# Patient Record
Sex: MALE | URBAN - METROPOLITAN AREA
[De-identification: names, ages, dates, MRNs, and addresses within clinical notes are randomized per-mention and may not be internally consistent; named-entity substitution may affect disease eponyms.]

---

## 2020-10-08 ENCOUNTER — HOSPITAL ENCOUNTER (OUTPATIENT)
Facility: CLINIC | Age: 45
Setting detail: OBSERVATION
Discharge: HOME | End: 2020-10-08
Attending: INTERNAL MEDICINE | Admitting: INTERNAL MEDICINE
Payer: COMMERCIAL

## 2020-10-08 VITALS
DIASTOLIC BLOOD PRESSURE: 51 MMHG | OXYGEN SATURATION: 90 % | TEMPERATURE: 101.5 F | HEIGHT: 71 IN | WEIGHT: 272.8 LBS | BODY MASS INDEX: 38.19 KG/M2 | HEART RATE: 92 BPM | RESPIRATION RATE: 18 BRPM | SYSTOLIC BLOOD PRESSURE: 114 MMHG

## 2020-10-08 DIAGNOSIS — I40.0 MYOCARDITIS DUE TO 2019-NCOV: Primary | ICD-10-CM

## 2020-10-08 DIAGNOSIS — U07.1 MYOCARDITIS DUE TO 2019-NCOV: Primary | ICD-10-CM

## 2020-10-08 LAB
ALBUMIN SERPL-MCNC: 2.8 G/DL (ref 3.4–5)
ALBUMIN SERPL-MCNC: 3 G/DL (ref 3.4–5)
ALP SERPL-CCNC: 58 U/L (ref 40–150)
ALP SERPL-CCNC: 59 U/L (ref 40–150)
ALT SERPL W P-5'-P-CCNC: 118 U/L (ref 0–70)
ALT SERPL W P-5'-P-CCNC: 123 U/L (ref 0–70)
ANION GAP SERPL CALCULATED.3IONS-SCNC: 5 MMOL/L (ref 3–14)
ANION GAP SERPL CALCULATED.3IONS-SCNC: 7 MMOL/L (ref 3–14)
AST SERPL W P-5'-P-CCNC: 255 U/L (ref 0–45)
AST SERPL W P-5'-P-CCNC: 267 U/L (ref 0–45)
BILIRUB SERPL-MCNC: 0.6 MG/DL (ref 0.2–1.3)
BILIRUB SERPL-MCNC: 0.6 MG/DL (ref 0.2–1.3)
BUN SERPL-MCNC: 11 MG/DL (ref 7–30)
BUN SERPL-MCNC: 13 MG/DL (ref 7–30)
CALCIUM SERPL-MCNC: 7.2 MG/DL (ref 8.5–10.1)
CALCIUM SERPL-MCNC: 7.8 MG/DL (ref 8.5–10.1)
CHLORIDE SERPL-SCNC: 91 MMOL/L (ref 94–109)
CHLORIDE SERPL-SCNC: 94 MMOL/L (ref 94–109)
CK SERPL-CCNC: 8536 U/L (ref 30–300)
CK SERPL-CCNC: 9048 U/L (ref 30–300)
CK SERPL-CCNC: 9368 U/L (ref 30–300)
CO2 SERPL-SCNC: 27 MMOL/L (ref 20–32)
CO2 SERPL-SCNC: 30 MMOL/L (ref 20–32)
CREAT SERPL-MCNC: 0.93 MG/DL (ref 0.66–1.25)
CREAT SERPL-MCNC: 0.94 MG/DL (ref 0.66–1.25)
CREAT SERPL-MCNC: 0.96 MG/DL (ref 0.66–1.25)
CRP SERPL-MCNC: 87.4 MG/L (ref 0–8)
D DIMER PPP FEU-MCNC: 0.9 UG/ML FEU (ref 0–0.5)
GFR SERPL CREATININE-BSD FRML MDRD: >90 ML/MIN/{1.73_M2}
GLUCOSE SERPL-MCNC: 106 MG/DL (ref 70–99)
GLUCOSE SERPL-MCNC: 108 MG/DL (ref 70–99)
INR PPP: 1.14 (ref 0.86–1.14)
INTERPRETATION ECG - MUSE: NORMAL
NT-PROBNP SERPL-MCNC: 175 PG/ML (ref 0–450)
POTASSIUM SERPL-SCNC: 3.1 MMOL/L (ref 3.4–5.3)
POTASSIUM SERPL-SCNC: 3.1 MMOL/L (ref 3.4–5.3)
POTASSIUM SERPL-SCNC: 3.3 MMOL/L (ref 3.4–5.3)
PROT SERPL-MCNC: 6.1 G/DL (ref 6.8–8.8)
PROT SERPL-MCNC: 6.4 G/DL (ref 6.8–8.8)
SODIUM SERPL-SCNC: 125 MMOL/L (ref 133–144)
SODIUM SERPL-SCNC: 127 MMOL/L (ref 133–144)
SODIUM SERPL-SCNC: 129 MMOL/L (ref 133–144)
TROPONIN I SERPL-MCNC: 0.03 UG/L (ref 0–0.04)
TROPONIN I SERPL-MCNC: 0.04 UG/L (ref 0–0.04)
TROPONIN I SERPL-MCNC: 0.04 UG/L (ref 0–0.04)

## 2020-10-08 PROCEDURE — 82565 ASSAY OF CREATININE: CPT | Performed by: INTERNAL MEDICINE

## 2020-10-08 PROCEDURE — 82550 ASSAY OF CK (CPK): CPT | Mod: 91 | Performed by: INTERNAL MEDICINE

## 2020-10-08 PROCEDURE — 84484 ASSAY OF TROPONIN QUANT: CPT | Performed by: INTERNAL MEDICINE

## 2020-10-08 PROCEDURE — 85379 FIBRIN DEGRADATION QUANT: CPT | Performed by: INTERNAL MEDICINE

## 2020-10-08 PROCEDURE — 99207 PR NO BILLABLE SERVICE THIS VISIT: CPT | Performed by: INTERNAL MEDICINE

## 2020-10-08 PROCEDURE — 84484 ASSAY OF TROPONIN QUANT: CPT | Mod: 91 | Performed by: INTERNAL MEDICINE

## 2020-10-08 PROCEDURE — 85610 PROTHROMBIN TIME: CPT | Performed by: INTERNAL MEDICINE

## 2020-10-08 PROCEDURE — 96372 THER/PROPH/DIAG INJ SC/IM: CPT | Performed by: INTERNAL MEDICINE

## 2020-10-08 PROCEDURE — 258N000003 HC RX IP 258 OP 636: Performed by: INTERNAL MEDICINE

## 2020-10-08 PROCEDURE — 36415 COLL VENOUS BLD VENIPUNCTURE: CPT | Performed by: INTERNAL MEDICINE

## 2020-10-08 PROCEDURE — 99235 HOSP IP/OBS SAME DATE MOD 70: CPT | Performed by: INTERNAL MEDICINE

## 2020-10-08 PROCEDURE — 86140 C-REACTIVE PROTEIN: CPT | Performed by: INTERNAL MEDICINE

## 2020-10-08 PROCEDURE — 84132 ASSAY OF SERUM POTASSIUM: CPT | Performed by: INTERNAL MEDICINE

## 2020-10-08 PROCEDURE — 93005 ELECTROCARDIOGRAM TRACING: CPT

## 2020-10-08 PROCEDURE — 84295 ASSAY OF SERUM SODIUM: CPT | Performed by: INTERNAL MEDICINE

## 2020-10-08 PROCEDURE — 250N000013 HC RX MED GY IP 250 OP 250 PS 637: Performed by: INTERNAL MEDICINE

## 2020-10-08 PROCEDURE — G0378 HOSPITAL OBSERVATION PER HR: HCPCS

## 2020-10-08 PROCEDURE — 250N000011 HC RX IP 250 OP 636: Performed by: INTERNAL MEDICINE

## 2020-10-08 PROCEDURE — 80053 COMPREHEN METABOLIC PANEL: CPT | Performed by: INTERNAL MEDICINE

## 2020-10-08 PROCEDURE — 83880 ASSAY OF NATRIURETIC PEPTIDE: CPT | Performed by: INTERNAL MEDICINE

## 2020-10-08 PROCEDURE — 82550 ASSAY OF CK (CPK): CPT | Performed by: INTERNAL MEDICINE

## 2020-10-08 RX ORDER — ACETAMINOPHEN 325 MG/1
650 TABLET ORAL EVERY 4 HOURS PRN
Status: DISCONTINUED | OUTPATIENT
Start: 2020-10-08 | End: 2020-10-08 | Stop reason: HOSPADM

## 2020-10-08 RX ORDER — HYDROXYZINE HYDROCHLORIDE 25 MG/1
25 TABLET, FILM COATED ORAL EVERY 6 HOURS PRN
Status: DISCONTINUED | OUTPATIENT
Start: 2020-10-08 | End: 2020-10-08 | Stop reason: HOSPADM

## 2020-10-08 RX ORDER — MULTIVITAMIN,THERAPEUTIC
1 TABLET ORAL DAILY
COMMUNITY

## 2020-10-08 RX ORDER — ACETAMINOPHEN 650 MG/1
650 SUPPOSITORY RECTAL EVERY 4 HOURS PRN
Status: DISCONTINUED | OUTPATIENT
Start: 2020-10-08 | End: 2020-10-08 | Stop reason: HOSPADM

## 2020-10-08 RX ORDER — POLYETHYLENE GLYCOL 3350 17 G/17G
17 POWDER, FOR SOLUTION ORAL DAILY PRN
Status: DISCONTINUED | OUTPATIENT
Start: 2020-10-08 | End: 2020-10-08 | Stop reason: HOSPADM

## 2020-10-08 RX ORDER — HYDROMORPHONE HYDROCHLORIDE 1 MG/ML
0.3 INJECTION, SOLUTION INTRAMUSCULAR; INTRAVENOUS; SUBCUTANEOUS
Status: DISCONTINUED | OUTPATIENT
Start: 2020-10-08 | End: 2020-10-08 | Stop reason: HOSPADM

## 2020-10-08 RX ORDER — LANOLIN ALCOHOL/MO/W.PET/CERES
3 CREAM (GRAM) TOPICAL
Status: DISCONTINUED | OUTPATIENT
Start: 2020-10-08 | End: 2020-10-08 | Stop reason: HOSPADM

## 2020-10-08 RX ORDER — MAGNESIUM SULFATE HEPTAHYDRATE 40 MG/ML
4 INJECTION, SOLUTION INTRAVENOUS EVERY 4 HOURS PRN
Status: DISCONTINUED | OUTPATIENT
Start: 2020-10-08 | End: 2020-10-08 | Stop reason: HOSPADM

## 2020-10-08 RX ORDER — POTASSIUM CL/LIDO/0.9 % NACL 10MEQ/0.1L
10 INTRAVENOUS SOLUTION, PIGGYBACK (ML) INTRAVENOUS
Status: DISCONTINUED | OUTPATIENT
Start: 2020-10-08 | End: 2020-10-08 | Stop reason: HOSPADM

## 2020-10-08 RX ORDER — OXYCODONE HYDROCHLORIDE 5 MG/1
5-10 TABLET ORAL
Status: DISCONTINUED | OUTPATIENT
Start: 2020-10-08 | End: 2020-10-08 | Stop reason: HOSPADM

## 2020-10-08 RX ORDER — AMOXICILLIN 250 MG
1 CAPSULE ORAL 2 TIMES DAILY PRN
Status: DISCONTINUED | OUTPATIENT
Start: 2020-10-08 | End: 2020-10-08 | Stop reason: HOSPADM

## 2020-10-08 RX ORDER — HYDROXYZINE HYDROCHLORIDE 25 MG/1
50 TABLET, FILM COATED ORAL EVERY 6 HOURS PRN
Status: DISCONTINUED | OUTPATIENT
Start: 2020-10-08 | End: 2020-10-08 | Stop reason: HOSPADM

## 2020-10-08 RX ORDER — HYDROXYZINE HYDROCHLORIDE 25 MG/1
25-50 TABLET, FILM COATED ORAL
COMMUNITY

## 2020-10-08 RX ORDER — ONDANSETRON 4 MG/1
4 TABLET, ORALLY DISINTEGRATING ORAL EVERY 6 HOURS PRN
Status: DISCONTINUED | OUTPATIENT
Start: 2020-10-08 | End: 2020-10-08 | Stop reason: HOSPADM

## 2020-10-08 RX ORDER — ONDANSETRON 2 MG/ML
4 INJECTION INTRAMUSCULAR; INTRAVENOUS EVERY 6 HOURS PRN
Status: DISCONTINUED | OUTPATIENT
Start: 2020-10-08 | End: 2020-10-08 | Stop reason: HOSPADM

## 2020-10-08 RX ORDER — POTASSIUM CHLORIDE 29.8 MG/ML
20 INJECTION INTRAVENOUS
Status: DISCONTINUED | OUTPATIENT
Start: 2020-10-08 | End: 2020-10-08 | Stop reason: HOSPADM

## 2020-10-08 RX ORDER — NALOXONE HYDROCHLORIDE 0.4 MG/ML
.1-.4 INJECTION, SOLUTION INTRAMUSCULAR; INTRAVENOUS; SUBCUTANEOUS
Status: DISCONTINUED | OUTPATIENT
Start: 2020-10-08 | End: 2020-10-08 | Stop reason: HOSPADM

## 2020-10-08 RX ORDER — LISINOPRIL AND HYDROCHLOROTHIAZIDE 20; 25 MG/1; MG/1
1 TABLET ORAL DAILY
COMMUNITY

## 2020-10-08 RX ORDER — AMOXICILLIN 250 MG
2 CAPSULE ORAL 2 TIMES DAILY PRN
Status: DISCONTINUED | OUTPATIENT
Start: 2020-10-08 | End: 2020-10-08 | Stop reason: HOSPADM

## 2020-10-08 RX ORDER — PROCHLORPERAZINE MALEATE 10 MG
10 TABLET ORAL EVERY 6 HOURS PRN
Status: DISCONTINUED | OUTPATIENT
Start: 2020-10-08 | End: 2020-10-08 | Stop reason: HOSPADM

## 2020-10-08 RX ORDER — BISACODYL 10 MG
10 SUPPOSITORY, RECTAL RECTAL DAILY PRN
Status: DISCONTINUED | OUTPATIENT
Start: 2020-10-08 | End: 2020-10-08 | Stop reason: HOSPADM

## 2020-10-08 RX ORDER — POTASSIUM CHLORIDE 1500 MG/1
20-40 TABLET, EXTENDED RELEASE ORAL
Status: DISCONTINUED | OUTPATIENT
Start: 2020-10-08 | End: 2020-10-08 | Stop reason: HOSPADM

## 2020-10-08 RX ORDER — PROCHLORPERAZINE 25 MG
25 SUPPOSITORY, RECTAL RECTAL EVERY 12 HOURS PRN
Status: DISCONTINUED | OUTPATIENT
Start: 2020-10-08 | End: 2020-10-08 | Stop reason: HOSPADM

## 2020-10-08 RX ORDER — DULOXETIN HYDROCHLORIDE 30 MG/1
30 CAPSULE, DELAYED RELEASE ORAL DAILY
COMMUNITY

## 2020-10-08 RX ORDER — POTASSIUM CHLORIDE 7.45 MG/ML
10 INJECTION INTRAVENOUS
Status: DISCONTINUED | OUTPATIENT
Start: 2020-10-08 | End: 2020-10-08 | Stop reason: HOSPADM

## 2020-10-08 RX ORDER — POTASSIUM CHLORIDE 1.5 G/1.58G
20-40 POWDER, FOR SOLUTION ORAL
Status: DISCONTINUED | OUTPATIENT
Start: 2020-10-08 | End: 2020-10-08 | Stop reason: HOSPADM

## 2020-10-08 RX ADMIN — SODIUM CHLORIDE, POTASSIUM CHLORIDE, SODIUM LACTATE AND CALCIUM CHLORIDE 1000 ML: 600; 310; 30; 20 INJECTION, SOLUTION INTRAVENOUS at 03:01

## 2020-10-08 RX ADMIN — POTASSIUM CHLORIDE 40 MEQ: 1500 TABLET, EXTENDED RELEASE ORAL at 02:16

## 2020-10-08 RX ADMIN — ACETAMINOPHEN 650 MG: 325 TABLET, FILM COATED ORAL at 02:16

## 2020-10-08 RX ADMIN — POTASSIUM CHLORIDE 20 MEQ: 1500 TABLET, EXTENDED RELEASE ORAL at 04:19

## 2020-10-08 RX ADMIN — POTASSIUM CHLORIDE 20 MEQ: 1500 TABLET, EXTENDED RELEASE ORAL at 11:14

## 2020-10-08 RX ADMIN — ENOXAPARIN SODIUM 40 MG: 40 INJECTION SUBCUTANEOUS at 02:17

## 2020-10-08 RX ADMIN — SODIUM CHLORIDE, POTASSIUM CHLORIDE, SODIUM LACTATE AND CALCIUM CHLORIDE 1000 ML: 600; 310; 30; 20 INJECTION, SOLUTION INTRAVENOUS at 07:43

## 2020-10-08 RX ADMIN — ACETAMINOPHEN 650 MG: 325 TABLET, FILM COATED ORAL at 09:46

## 2020-10-08 ASSESSMENT — MIFFLIN-ST. JEOR: SCORE: 2154.54

## 2020-10-08 NOTE — H&P
[facility] [place]    History and Physical - Hospitalist Service       Date of Admission:  10/8/2020    Assessment & Plan   Jarett DANICA Restrepo is a 45 year old male admitted on 10/8/2020. He presents as a direct admission from the Urgency Room for new diagnosis COVID 19 with elevated troponin concerning for myocarditis related to acute viral infection.    Novel COVID-19 associated myocarditis: Mildly elevated troponin at outside urgent care.  Reported at 0.7 range, though I am not able to see this laboratory result through outside records at this time.  Some family history of coronary artery disease, no personal history of CAD or vascular disease.  Currently chest pain-free with normal sinus rhythm on EKG  Acute COVID-19 viral illness: Moderate disease given reported elevated troponins, fever.  Inflammatory markers pending, though currently appears comfortable and nontoxic with no hypoxia.  Generally I do not anticipate patient will qualify for antiviral treatment unless clinical decompensation, and in the absence of hypoxia, would not treat with steroids.  -Oximetry  -Telemetry  -TTE  -EKG  -NT proBNP added on.  Low suspicion for any significant elevation given absence of orthopnea or overt heart failure.  - completing troponin trend; a relatively stable troponin trend would be reassuring  -I have not consulted cardiology at this time; low suspicion of ACS  -D-dimer to guide subcutaneous Lovenox dosing added on  -CMP, CK, CRP.  Addendum: CK elevated in the 9000 range.  1 L lactated Ringer bolus, recheck sodium and CK later this morning    Palpitations: For the past 3 days or so, patient has been reporting nocturnal palpitations.  This was diagnosed as anxiety initially, though I actually suspect patient may be having nocturnal fevers and associated symptoms of diaphoresis, tachycardia, and tachypnea which is interpreted as anxiety and palpitations, though might simply represent physiologic changes  associated with acute illness.  It is possible that patient is having dysrhythmias such as atrial fibrillation associated with myocarditis, though no evidence of this thus far.  Note recent order for outpatient cardiac monitor from outside facility to evaluate this  -Cardiac telemetry  -TTE as above  -In the setting of COVID-19 diagnosis, would monitor for recurrent symptoms, and if patient experiences similar symptoms overnight associated with fever and tachycardia, would consider holding on plan for outpatient cardiac monitoring for palpitations; could revisit this if patient has persistent episodes of palpitations following convalescence from acute illness.    Hypertension:  -Prior to admission hydrochlorothiazide/lisinopril combination on hold    Hyponatremia: Serum sodium of 125 at outside hospital.  Suspect secondary to poor oral intake, though also note diuretic therapy  -Recheck CMP, trend sodium  -1 L lactated Ringer bolus as above; caution with aggressive IV fluids in the setting of COVID-19 diagnosis  -Prior to admission hydrochlorothiazide on hold    Hypokalemia: Suspect to poor oral intake  -Potassium, magnesium replacement protocols in place.       Diet:   regular adult diet as tolerated  DVT Prophylaxis: Enoxaparin (Lovenox) SQ  Quick Catheter: not present  Code Status:   full code         Disposition Plan   Expected discharge: Today, recommended to prior living arrangement once TTE complete, no hypoxia, remains asymptomatic/minimally symptomatic.  Entered: Marco Read MD 10/08/2020, 12:38 AM     The patient's care was discussed with the Patient and Dr Keith at outside urgent care.    Marco Read MD  [facility]  Contact information available via [facility] Paging/Directory      ______________________________________________________________________    Chief Complaint   Nocturnal palpitations    History is obtained from the patient, chart review, discussion with   Sagar at  prior to transfer, discussion with patient's wife, Nora, review of outside records including recent evaluation at outside clinic (and subsequent urgent care) w/ concern for anxiety, then covid diagnosis.    History of Present Illness   Jarett Restrepo is a 45 year old male who presents as a direct admission from the emergency room in [place] with new diagnosis of COVID-19 and mildly elevated troponin.  Transferred for troponin trend and echocardiogram.  Not hypoxic, not toxic.  Observation admission.    For the past 3 days, patient has been experiencing palpitations as he lies awake at night.  He has not noted any fevers, though reports racing heartbeats, feeling diaphoretic and breathing heavily during these events.  It keeps him from sleeping at night, and this actually led him to be evaluated through [facility] clinic 10/6/2020.  Has had some decreased appetite, some ongoing dry cough which he thought was unchanged.  No shortness of breath associated with the events.  It was thought that his symptoms were secondary to anxiety as a patient has a history of anxiety in the past, and he was prescribed Cymbalta and Atarax, TSH checked and within normal limits, recommendation for ZIO patch monitoring for palpitations.  Patient found medications to be helpful for sleep last night, though presented to urgent care 10/7 with complaints of abdominal discomfort and bloating.    He was noted to have a low-grade temperature, and this resulted in a molecular COVID-19 test.  COVID was positive.    Patient reports somewhat frequent small soft stools for the past 3-5 days as well, suspect related to COVID 19.     A CT of the abdomen and pelvis was performed with colonic diverticula without diverticulitis.  Mild splenomegaly and fatty infiltration of liver present.  Abdomen otherwise unremarkable.  Lower lung fields on CT described as predominantly peripheral multifocal groundglass infiltrates which would be  considered consistent with COVID-19.    Patient not hypoxic during evaluation at outside urgent care.    Troponin elevated at 0.726 at outside urgent care, recheck 0.760 per signout and note; I am unable to view this lab through care everywhere, however.     Based on elevated troponin in the setting of COVID-19, there was a suspicion for viral myocarditis, and [facility] was contacted for direct admission including troponin trend and echocardiogram.  There is a family history of coronary artery disease with patient's father having coronary artery disease in his 50s as well as a maternal grandfather with coronary artery disease later in life.  It should be noted, however, that father was 1 of 7 siblings and the only one with coronary artery disease.  Father was also exposed to agent orange in [place] and CAD could potentially be related to this exposure.  Patient himself is a non-smoker, has a history of hypertension and slightly low HDL.  LDL of 98 with normal cholesterol HDL ratio in November 2019.    Patient is a teacher, .  He has 3 children including an 8-year-old and 2 teenagers who are in a hybrid learning situation between in person and online school.  He has had increased stress associated with distance learning management.    Though patient has a history of anxiety, his wife reports that this is typically not such a significant problem for him.  Discussed my concern that patient's symptoms of possible anxiety are actually physiologic changes associated with his acute viral illness including diaphoresis, tachycardia, tachypnea associated with nocturnal fevers.  Wife believes this may be the case.  This said, I still anticipate patient to have anxiety associated with his diagnosis in the setting of underlying anxiety.  This will be somewhat difficult to discern from his acute illness in the absence of vital sign monitoring upon discharge, though we could potentially provide some insight  if he continues to have similar symptoms during monitoring and demonstrates, for example, fever as a cause for his symptoms and associated vital sign changes.    Patient currently has no pain or discomfort.  No pleuritic discomfort, no abdominal discomfort with palpation.  He has no positional discomfort of his chest, no chest pain at all, actually.  He is not hypoxic, does not have any increased work of breathing.  He tells me that he has a dry cough which has started in the past 3 days or so, nonproductive.  Previously documented that he has a chronic cough which is unchanged, though patient does report that this cough is different.  No other family members are reported as ill including his children or wife.    Discussed distancing from family including children now would be considered to have an exposure and should continue with distance learning.  This was discussed with both patient as well as his wife over the telephone.  Generally, patient's presentation is reassuring.  Diagnosis of COVID-19 came as somewhat of surprise to both the patient and his wife, and in discussion with them, this is actually a good clinical indicator based on the fact that he was minimally symptomatic.         Review of Systems    The 10 point Review of Systems is negative other than noted in the HPI or here.  Patient unaware of fevers, though febrile to 102 on arrival  Currently without palpitations  Small loose stools for the past 1 week  Decreased appetite, though no loss of taste or smell    Past Medical History    I have reviewed this patient's medical history and updated it with pertinent information if needed.   Hypertension  anxiety    Past Surgical History   I have reviewed this patient's surgical history and updated it with pertinent information if needed.  Knee arthroscopy, arthroplasty  Vasectomy    Social History   I have reviewed this patient's social history and updated it with pertinent information if needed.  Social  "History     Tobacco Use     Smoking status: Never   Substance Use Topics     Alcohol use: Occasional (several times per month)     Drug use: No       Family History     Father w/ coronary artery disease in his 50s, but exposed to agent orange in [place] (father was one of 7 siblings, no other siblings w/ CAD)  Maternal grandfather with coronary disease    Prior to Admission Medications   Lisinopril hydrochlorothiazide combo pill     Allergies   No Known Allergies    Physical Exam    Vital signs:  Temp: 100.4  F (38  C) Temp src: Oral BP: 138/59 Pulse: 81   Resp: 18 SpO2: 95 % O2 Device: None (Room air)   Height: 180.3 cm (5' 11\") Weight: 123.7 kg (272 lb 12.8 oz)  Estimated body mass index is 38.05 kg/m  as calculated from the following:    Height as of this encounter: 1.803 m (5' 11\").    Weight as of this encounter: 123.7 kg (272 lb 12.8 oz).    General Appearance: Obese 45-year-old male laying comfortably in bed.  He is in no acute distress.  Slightly flushed appearing  Eyes: No scleral icterus or injection  HEENT: Normocephalic, atraumatic  Respiratory: Few scattered inspiratory crackles on auscultation, though these are very minimal.  No wheezing, no increased work of breathing or tachypnea.  Cardiovascular: Regular rate and rhythm with heart rate currently in the 80s range.  No murmur  GI: Abdomen obese, soft, nontender to palpation.  Bowel sounds present.  No palpable mass.  Lymph/Hematologic: No lower extremity edema  Genitourinary: Not examined  Skin: Flushed appearing.  Patient is warm, and anticipate fever even prior to vitals being obtained.  Patient is indeed febrile to 100.4, several minutes later febrile to 102.  No petechiae, no rash  Musculoskeletal: Muscular tone intact in all extremities.  Neurologic: Alert, conversant, appropriate conversation.  Mental status grossly intact.  Psychiatric: Normal affect, very pleasant.  Mildly anxious in regards to forward-looking interpretation of symptoms.  " Largely he is concerned about duration of nocturnal symptoms/palpitations and anxiety which have been keeping him from getting sleep.    Data   Data reviewed today: I reviewed all medications, new labs and imaging results over the last 24 hours. I personally reviewed EKG with normal sinus rhythm    Recent Labs   Lab 10/08/20  0526 10/08/20  0108   INR  --  1.14   * 125*   POTASSIUM 3.3* 3.1*   CHLORIDE 94 91*   CO2 30 27   BUN 11 13   CR 0.93 0.94   ANIONGAP 5 7   ROME 7.8* 7.2*   * 108*   ALBUMIN 2.8* 3.0*   PROTTOTAL 6.1* 6.4*   BILITOTAL 0.6 0.6   ALKPHOS 58 59   * 123*   * 267*   TROPI 0.035 0.033

## 2020-10-08 NOTE — PLAN OF CARE
Observation goals PRIOR TO DISCHARGE     Comments:   -Diagnostic tests and consults completed and resulted -Not met    -Vital signs normal or at patient baseline -Not met    -Tolerating oral intake to maintain hydration -Met    -No hypoxia -Partially met    Nurse to notify provider when observation goals have been met and patient is ready for discharge.

## 2020-10-08 NOTE — PLAN OF CARE
Pt is a direct admit from urgency room in [place].  Covid +ve.  A&Ox4,elevated temp T max 102.4, prn tylenol given.  Critical CKs, fluid bolus given. Tele-NSR.Jose reg diet,up ind, amb to bathroom and voiding o.k.  Echo cancelled.  Pt. Educated on discharge paperwork.  Pt. Discharged to home w/ wife.

## 2020-10-08 NOTE — PROGRESS NOTES
Page to Dr. Olivares.    Need ECHO cardiogram entered.  Per Dr. RENO it is entered for 2 weeks into the future.  Writer will call clinic to schedule.    JEFFREY Blackman  [facility]  Inpatient Care Coordinator  M: [phone]

## 2020-10-08 NOTE — DISCHARGE SUMMARY
[facility]  Hospitalist Discharge Summary      Date of Admission:  10/8/2020  Date of Discharge:  10/8/2020 12:48 PM  Discharging Provider: Jeff Olivares DO      Discharge Diagnoses   1. COVID 19 with suspected viral myocarditis  2. HTN   3. Mild hyponatremia    Follow-ups Needed After Discharge   Follow-up Appointments     Follow-up and recommended labs and tests       Follow up with primary care provider, No primary care provider on file.,   within 2-4 weeks, for hospital follow- up. The following labs/tests are   recommended: echocardiogram & BMP            Unresulted Labs Ordered in the Past 30 Days of this Admission     No orders found from 9/8/2020 to 10/9/2020.        Discharge Disposition   Discharged to home  Condition at discharge: Stable    Hospital Course   Jarett Restrepo is a 45 year old male admitted on 10/8/2020. He presents as a direct admission from the Urgency Room for new diagnosis COVID 19 with elevated troponin concerning for myocarditis related to acute viral infection.     Novel COVID-19 associated myocarditis: Mildly elevated troponin at outside urgent care.  Reported at 0.7 range, though I am not able to see this laboratory result through outside records at this time.  Some family history of coronary artery disease, no personal history of CAD or vascular disease.  Currently chest pain-free with normal sinus rhythm on EKG  Acute COVID-19 viral illness: Moderate disease given reported elevated troponins, fever.  Inflammatory markers pending, though currently appears comfortable and nontoxic with no hypoxia.  Generally I do not anticipate patient will qualify for antiviral treatment unless clinical decompensation, and in the absence of hypoxia, would not treat with steroids.  Palpitations: For the past 3 days or so, patient has been reporting nocturnal palpitations.  This was diagnosed as anxiety initially, though I actually suspect patient may be having  nocturnal fevers and associated symptoms of diaphoresis, tachycardia, and tachypnea which is interpreted as anxiety and palpitations, though might simply represent physiologic changes associated with acute illness.  It is possible that patient is having dysrhythmias such as atrial fibrillation associated with myocarditis, though no evidence of this thus far.  Note recent order for outpatient cardiac monitor from outside facility to evaluate this  - Cardiac telemetry without any significant abnormalities  - Troponins 0.033 and 0.0035 whil here  - EKG without any acute abnormalities  - Ideally would like to get an echocardiogram but unable to be done due to COVID 19 status.  Outpatient echocardiogram ordered for 2 weeks from now once patient recovered       Hyponatremia: Serum sodium of 125 at outside hospital.  Suspect secondary to poor oral intake, though also note diuretic therapy.  Improved to 127-129 after IVF.    -Prior to admission hydrochlorothiazide on hold but can resume tomorrow    CK elevated  Likely related to myalgias and possible myocarditis from COVID 19.  Improved with IVF.  On time of discharge patient feeling back to baseline and tolerating PO well.     Consultations This Hospital Stay   CARE MANAGEMENT / SOCIAL WORK IP CONSULT    Code Status   Full Code    Time Spent on this Encounter   I, Jeff Olivares DO, personally saw the patient today and spent less than or equal to 30 minutes discharging this patient.       Jeff Olivares DO  [facility]  [address] [facility] [address] [facility] [address]  Phone: [phone]  ______________________________________________________________________    Physical Exam   Vital Signs: Temp: 101.5  F (38.6  C) Temp src: Oral BP: 114/51 Pulse: 92   Resp: 18 SpO2: 90 % O2 Device: None (Room air)    Weight: 272 lbs 12.8 oz  General Appearance: Resting comfortably. NAD  Respiratory: No respiratory distress on RA  Cardiovascular: RRR  GI:  Non-distended.  Soft  Skin: No obvious rashes or cyanosis  Other: Moving all extremities grossly        Primary Care Physician   Alejo Esposito    Discharge Orders      Reason for your hospital stay    COVID 19     Follow-up and recommended labs and tests     Follow up with primary care provider, No primary care provider on file., within 2-4 weeks, for hospital follow- up. The following labs/tests are recommended: echocardiogram.     Activity    Your activity upon discharge: activity as tolerated     Diet    Follow this diet upon discharge: Orders Placed This Encounter      Regular Diet Adult       Significant Results and Procedures   Most Recent 3 CBC's:No lab results found.  Most Recent 3 BMP's:  Recent Labs   Lab Test 10/08/20  1034 10/08/20  0526 10/08/20  0108   * 129* 125*   POTASSIUM 3.1* 3.3* 3.1*   CHLORIDE  --  94 91*   CO2  --  30 27   BUN  --  11 13   CR 0.96 0.93 0.94   ANIONGAP  --  5 7   ROME  --  7.8* 7.2*   GLC  --  106* 108*     Most Recent 3 Troponin's:  Recent Labs   Lab Test 10/08/20  1034 10/08/20  0526 10/08/20  0108   TROPI 0.035 0.035 0.033   , No results found for this or any previous visit.    Discharge Medications   Current Discharge Medication List      CONTINUE these medications which have NOT CHANGED    Details   DULoxetine (CYMBALTA) 30 MG capsule Take 30 mg by mouth daily 30mg daily x1 week, then increase to 60mg daily.      hydrOXYzine (ATARAX) 25 MG tablet Take 25-50 mg by mouth nightly as needed for itching      lisinopril-hydrochlorothiazide (ZESTORETIC) 20-25 MG tablet Take 1 tablet by mouth daily      multivitamin, therapeutic (THERA-VIT) TABS tablet Take 1 tablet by mouth daily           Allergies   No Known Allergies

## 2020-10-08 NOTE — PLAN OF CARE
Observation goals PRIOR TO DISCHARGE     Comments:   -Diagnostic tests and consults completed and resulted -Not met    -Vital signs normal or at patient baseline -Not met    -Tolerating oral intake to maintain hydration -Met    -No hypoxia -Partially met    Nurse to notify provider when observation goals have been met and patient is ready for discharge.        Pt is a direct admit from urgency room in [facility] .He is covid +ve.A&Ox4,elevated temp T max 102.1, prn tylenol given with good effect ,last temp 98.6.Critical CKs, fluid bolus given, low K+ 3.1, replaced, recheck due at 0825.Tele-NSR.Jose reg diet,up ind, amb to bathroom and voiding o.k.Plan is ECHO today and possible discharge if doing well.

## 2020-10-08 NOTE — PROVIDER NOTIFICATION
Brief update:    CK still elevated in 9K range    2nd 1L LR bolus ordered    Marco Read MD  6:45 AM

## 2020-10-08 NOTE — PHARMACY-ADMISSION MEDICATION HISTORY
Pharmacy Medication History  Admission medication history interview status for the 10/8/2020  admission is complete. See EPIC admission navigator for prior to admission medications       Medication history sources: Patient and Care Everywhere  Location of interview: Phone  Medication history source reliability: Good  Adherence assessment: Good    Significant changes made to the medication list:  Added all medications      Additional medication history information:   None    Medication reconciliation completed by provider prior to medication history? No    Time spent in this activity: 10 min    Prior to Admission medications    Medication Sig Last Dose Taking? Auth Provider   DULoxetine (CYMBALTA) 30 MG capsule Take 30 mg by mouth daily 30mg daily x1 week, then increase to 60mg daily. 10/7/2020 at am Yes Unknown, Entered By History   hydrOXYzine (ATARAX) 25 MG tablet Take 25-50 mg by mouth nightly as needed for itching 10/7/2020 at hs Yes Unknown, Entered By History   lisinopril-hydrochlorothiazide (ZESTORETIC) 20-25 MG tablet Take 1 tablet by mouth daily 10/7/2020 at am Yes Unknown, Entered By History   multivitamin, therapeutic (THERA-VIT) TABS tablet Take 1 tablet by mouth daily 10/7/2020 at am Yes Unknown, Entered By History

## 2020-10-09 ENCOUNTER — NURSE TRIAGE (OUTPATIENT)
Dept: CARDIOLOGY | Facility: CLINIC | Age: 45
End: 2020-10-09

## 2020-10-09 NOTE — TELEPHONE ENCOUNTER
Received a call from spouse Nora who states her  was discharged from the OBS unit yesterday 10/8/20 and was not able to be there with him due to him being COVID-19 positive and was not able to speak to the doctor to review any discharge instructions. Jarett was in the room and gave verbal permission to speak to Nora. Nora is wondering about his activity and what he should or should not be doing. Nora states Jarett has no energy and attempted to go for a walk with her today, because they thought some activity would be good but he could only move very slowly and his heart rate shot up to around 160. In reviewing the discharge summary, it was recommended that he [facility] with a BMP and have an outpatient echocardiogram in 2 weeks. Advised to call and speak to his PCP clinic today to discuss discharge instructions further. Advised if he is doing poorly to return to the ED for an evaluation. Nora verbalized agreement and understanding.